# Patient Record
Sex: MALE | Race: BLACK OR AFRICAN AMERICAN | NOT HISPANIC OR LATINO | URBAN - METROPOLITAN AREA
[De-identification: names, ages, dates, MRNs, and addresses within clinical notes are randomized per-mention and may not be internally consistent; named-entity substitution may affect disease eponyms.]

---

## 2023-05-08 ENCOUNTER — APPOINTMENT (OUTPATIENT)
Dept: LAB | Facility: LAB | Age: 58
End: 2023-05-08
Payer: COMMERCIAL

## 2023-05-08 LAB
ALBUMIN (MG/L) IN URINE: 31.4 MG/L
ALBUMIN/CREATININE (UG/MG) IN URINE: 17.8 UG/MG CRT (ref 0–30)
CREATININE (MG/DL) IN URINE: 176 MG/DL (ref 20–370)
ESTIMATED AVERAGE GLUCOSE FOR HBA1C: 140 MG/DL
HEMOGLOBIN A1C/HEMOGLOBIN TOTAL IN BLOOD: 6.5 %

## 2023-06-26 ENCOUNTER — OFFICE VISIT (OUTPATIENT)
Dept: PRIMARY CARE | Facility: CLINIC | Age: 58
End: 2023-06-26
Payer: COMMERCIAL

## 2023-06-26 VITALS
SYSTOLIC BLOOD PRESSURE: 118 MMHG | HEART RATE: 62 BPM | HEIGHT: 64 IN | OXYGEN SATURATION: 100 % | WEIGHT: 180.1 LBS | DIASTOLIC BLOOD PRESSURE: 80 MMHG | BODY MASS INDEX: 30.75 KG/M2 | TEMPERATURE: 97.6 F

## 2023-06-26 DIAGNOSIS — E11.69 TYPE 2 DIABETES MELLITUS WITH OTHER SPECIFIED COMPLICATION, UNSPECIFIED WHETHER LONG TERM INSULIN USE (MULTI): Primary | ICD-10-CM

## 2023-06-26 PROCEDURE — 3044F HG A1C LEVEL LT 7.0%: CPT

## 2023-06-26 PROCEDURE — 99203 OFFICE O/P NEW LOW 30 MIN: CPT

## 2023-06-26 PROCEDURE — 3079F DIAST BP 80-89 MM HG: CPT

## 2023-06-26 PROCEDURE — 1036F TOBACCO NON-USER: CPT

## 2023-06-26 PROCEDURE — 3074F SYST BP LT 130 MM HG: CPT

## 2023-06-26 PROCEDURE — 3008F BODY MASS INDEX DOCD: CPT

## 2023-06-26 RX ORDER — DULAGLUTIDE 0.75 MG/.5ML
INJECTION, SOLUTION SUBCUTANEOUS
COMMUNITY
Start: 2023-05-05 | End: 2023-06-26

## 2023-06-26 RX ORDER — ISOPROPYL ALCOHOL 70 ML/100ML
SWAB TOPICAL
COMMUNITY
Start: 2023-02-10

## 2023-06-26 RX ORDER — METFORMIN HYDROCHLORIDE 500 MG/1
500 TABLET ORAL
COMMUNITY
End: 2024-01-05 | Stop reason: SDUPTHER

## 2023-06-26 RX ORDER — DULAGLUTIDE 1.5 MG/.5ML
1.5 INJECTION, SOLUTION SUBCUTANEOUS
Qty: 2 ML | Refills: 11 | Status: SHIPPED | OUTPATIENT
Start: 2023-06-26 | End: 2024-01-05 | Stop reason: WASHOUT

## 2023-06-26 RX ORDER — CALCIUM CITRATE/VITAMIN D3 200MG-6.25
TABLET ORAL
COMMUNITY
Start: 2023-02-10

## 2023-06-26 RX ORDER — INSULIN GLARGINE 100 [IU]/ML
INJECTION, SOLUTION SUBCUTANEOUS
COMMUNITY
End: 2024-01-05 | Stop reason: WASHOUT

## 2023-06-26 RX ORDER — ERTUGLIFLOZIN 15 MG/1
TABLET, FILM COATED ORAL
COMMUNITY
Start: 2023-05-02 | End: 2024-01-05 | Stop reason: WASHOUT

## 2023-06-26 RX ORDER — INSULIN LISPRO 100 [IU]/ML
INJECTION, SOLUTION INTRAVENOUS; SUBCUTANEOUS
COMMUNITY
Start: 2023-02-10 | End: 2024-01-05 | Stop reason: WASHOUT

## 2023-06-26 ASSESSMENT — ENCOUNTER SYMPTOMS
SHORTNESS OF BREATH: 0
FREQUENCY: 0

## 2023-06-26 ASSESSMENT — PAIN SCALES - GENERAL: PAINLEVEL: 0-NO PAIN

## 2023-06-26 NOTE — PROGRESS NOTES
"Subjective   Patient ID:   Nohemy White is a 58 y.o. male who presents for Establish Care (Type 2 Diabetes).  HPI  Mr. White was first diagnosed with T2DM in April 2021 with plan for follow-up of HbA1c in 2 months, but patient was lost to follow-up. Presented to hospital in Feb 2023 with fungal balangitis and a blood glucose in 400s. Discharged after 3 day hospital stay. Afterwards, began taking anti-diabetic medications and monitoring his blood gluocse at home with a Freestyle You monitor. Has been regularly visiting endocrinologist for management of his T2DM and has another follow-up on 6/30. Presents to clinic today to establish care. Reports that he watches what he eats and monitors blood sugar. He reports BG ranges between . Reports good adherence to anti-diabetic drugs. On ROS, patient denies loss of sensation in extremities, any changes in bladder habits, foot ulcers, or changes in vision. HE currently takes Metformin BID, Steglatro every day, Trulicity weekly, and 20U Lantus at bedtime.        Review of Systems   Respiratory:  Negative for shortness of breath.    Cardiovascular:  Negative for chest pain.   Genitourinary:  Negative for frequency.       Objective   /80 (BP Location: Left arm, Patient Position: Sitting, BP Cuff Size: Adult)   Pulse 62   Temp 36.4 °C (97.6 °F) (Temporal)   Ht 1.626 m (5' 4\")   Wt 81.7 kg (180 lb 1.6 oz)   SpO2 100%   BMI 30.91 kg/m²    Physical Exam  Constitutional:       General: He is not in acute distress.     Appearance: Normal appearance.   Eyes:      Extraocular Movements: Extraocular movements intact.   Cardiovascular:      Rate and Rhythm: Normal rate and regular rhythm.      Heart sounds: Normal heart sounds. No murmur heard.  Pulmonary:      Effort: Pulmonary effort is normal. No respiratory distress.      Breath sounds: Normal breath sounds.   Abdominal:      General: There is no distension.      Palpations: Abdomen is soft.      Tenderness: " There is no abdominal tenderness.   Skin:     Comments: No wounds seen on feet B/L, dry flaky skin with thickened nails   Neurological:      Mental Status: He is alert.         Assessment/Plan     Problem List Items Addressed This Visit    None  Nohemy White is a 58 year old with PHHx of DM who presents to the clinic to establish care.     #DM  - Last Hgb A1C 6.5 on 5/8/2023  - c/w metformin 500 mg BID & Steglatro every day  - Informed patient to stop Lantus  - Increased Trulicity from 0.75 mg to 1.5 mg Qweekly  - Referred to Ophthalmology & podiatry     RTC in 3 months for HM visit.    The patient was discussed with Dr. Ruiz.    Francesco Higgins MS3    I agree with the above assessment and plan.    Ashutosh Briggs MD  Family Medicine   PGY-2

## 2023-06-27 NOTE — PROGRESS NOTES
58 year old with PHHx of DM who presents to the clinic to establish care. Last Hgb A1C 6.5 on 5/8/2023. C/w metformin 500 mg BID & Steglatro every day. Patient informed to stop Lantus as Endocrine has previously mentioned. Increased Trulicity from 0.75 mg to 1.5 mg Qweekly. Referred to Ophthalmology & podiatry     Patient discussed with Dr. Ruiz.    Hugo Callaway MD   Resident Physician, PGY3  Family and Preventive Medicine

## 2023-07-03 NOTE — PROGRESS NOTES
I reviewed with the resident the medical history and the resident’s findings on physical examination.  I discussed with the resident the patient’s diagnosis and concur with the treatment plan as documented in the resident note.     Chaz Ruiz MD

## 2023-08-14 ENCOUNTER — APPOINTMENT (OUTPATIENT)
Dept: LAB | Facility: LAB | Age: 58
End: 2023-08-14
Payer: COMMERCIAL

## 2023-08-14 LAB
CHOLESTEROL (MG/DL) IN SER/PLAS: 149 MG/DL (ref 0–199)
CHOLESTEROL IN HDL (MG/DL) IN SER/PLAS: 67.3 MG/DL
CHOLESTEROL/HDL RATIO: 2.2
ESTIMATED AVERAGE GLUCOSE FOR HBA1C: 123 MG/DL
HEMOGLOBIN A1C/HEMOGLOBIN TOTAL IN BLOOD: 5.9 %
LDL: 71 MG/DL (ref 0–99)
TRIGLYCERIDE (MG/DL) IN SER/PLAS: 52 MG/DL (ref 0–149)
VLDL: 10 MG/DL (ref 0–40)

## 2023-11-13 ENCOUNTER — LAB (OUTPATIENT)
Dept: LAB | Facility: LAB | Age: 58
End: 2023-11-13
Payer: COMMERCIAL

## 2023-11-13 DIAGNOSIS — E11.9 TYPE 2 DIABETES MELLITUS WITHOUT COMPLICATIONS (MULTI): Primary | ICD-10-CM

## 2023-11-13 LAB
EST. AVERAGE GLUCOSE BLD GHB EST-MCNC: 123 MG/DL
HBA1C MFR BLD: 5.9 %

## 2023-11-13 PROCEDURE — 83036 HEMOGLOBIN GLYCOSYLATED A1C: CPT

## 2023-11-13 PROCEDURE — 36415 COLL VENOUS BLD VENIPUNCTURE: CPT

## 2024-01-03 NOTE — PROGRESS NOTES
Endocrinology  1/5/2024    History of Present Illness   Nohemy White is a 58 y.o. year old male with medical history of T2D, here for diabetes.     LV: 6/2023, Amee WAYNE  No health changes since last visit.   He continue to follow low carb and cut out sugary drinks.    Current regimen:   Metformin 500 mg BID     SMBG: Libre3     TIR at goal and essentially 100%, with lows registering when sensor malfunctioned   Very low variability  No hyperglycemia    Hypoglycemia: None    Hypoglycemia awareness: None   Weight changes: Denies   Denies any increased thirst, urination, or blurry vision.     Diet:   - 2 meals a day     DM history:   Year/age at diagnosis: February 2023 - diagnosed, A1c 10.9%  Prior medications:   - Steglatro - stopped due to cost   - Trulicity - was doing well so it was stopped   - Lantus - no longer needed    Previous hospitalizations for hyperglycemia: Denies   Complications: Denies   Last eye exam: 2015   Last foot exam: Never     Review of Systems   General: Denies fever or chills   Head: Denies headache or vision changes   Neck: Denies tenderness or lumps   Cardiac: Denies chest pain or palpitations   Respiratory: Denies shortness of breath or cough   GI: Denies abdominal pain, nausea, or vomiting   Extremities: Denies swelling   Skin: Denies rash     Medications     Current Outpatient Medications   Medication Instructions    Easy Touch Alcohol Prep Pads pads, medicated     Lantus Solostar U-100 Insulin 100 unit/mL (3 mL) pen INJECT 30 UNITS SUBCUTANEOUSLY IN THE EVENING AS DIRECTED    metFORMIN (GLUCOPHAGE) 500 mg, oral, 2 times daily with meals    True Metrix Glucose Test Strip strip         History     Past Medical History:   Diagnosis Date    Personal history of other diseases of male genital organs 04/28/2021    History of benign prostatic hyperplasia       No past surgical history on file.    No family history on file.     No Known Allergies     Social history:   - No tobacco  "use   - No alcohol use   - Works in [employer] department at [employer] - on his feet all day    - Lives with mother and sister     Physical Exam   /66   Pulse 79   Ht 1.626 m (5' 4\")   Wt 80.7 kg (178 lb)   BMI 30.55 kg/m²   General: Well appearing, no acute distress  Heart: Normal rate  Neck: No visible goiter  Lungs: Breathing comfortably on room air   Skin: No rashes    Labs and Imaging     Lab Results   Component Value Date    HGBA1C 5.9 (H) 11/13/2023    HGBA1C 5.9 (A) 08/14/2023    HGBA1C 6.5 (A) 05/08/2023     (L) 02/10/2023    K 3.6 02/10/2023    CL 99 02/10/2023    CO2 26 02/10/2023    BUN 13 02/10/2023    CREATININE 0.96 02/10/2023    CALCIUM 8.5 (L) 02/10/2023    ALBUMIN 4.1 02/07/2023    PROT 7.5 02/07/2023    BILITOT 0.6 02/07/2023    ALKPHOS 122 (H) 02/07/2023    ALT 30 02/07/2023    AST 41 (H) 02/07/2023    GLUCOSE 244 (H) 02/10/2023    CHOL 149 08/14/2023    TRIG 52 08/14/2023    HDL 67.3 08/14/2023    BHYDRXBUT 0.22 04/09/2021    HTM91RP 0.00 04/09/2021     Component      Latest Ref Rng 5/8/2023   ALBUMIN (MG/L) IN URINE      Not Established mg/L 31.4    Albumin/Creatine Ratio      0.0 - 30.0 ug/mg crt 17.8    Creatinine, Urine Random      20.0 - 370.0 mg/dL 176.0        Component      Latest Ref Rng 8/14/2023   CHOLESTEROL      0 - 199 mg/dL 149    HDL CHOLESTEROL      mg/dL 67.3    Cholesterol/HDL Ratio 2.2    LDL Calculated      0 - 99 mg/dL 71    VLDL      0 - 40 mg/dL 10    TRIGLYCERIDES      0 - 149 mg/dL 52        Assessment and Plan   Nohemy White is a 58 y.o. year old male with medical history of T2D, here for diabetes. He is doing excellent, with essentially 100% TIR. He is only on metformin now (at time of diagnosis, A1c > 10% and he was on lantus, GLP1RA, and SGLT2i). No other co-morbidities such as CKD or ASCVD. Given his diagnosis of T2D, he should be on a moderate intensity statin as well and needs yearly eye exams.     # T2D:   - Continue metformin 500 mg BID   - " Continue Libre3 - he finds this helpful   - Lipids: LDL right at goal (< 70). Due 8/2024, start atorvastatin 10 mg daily    - BP: Controlled, not on any antihypertensives    - Urine Alb/Cr: Normal, due 5/2024  - Eye exam: Referral placed, due now     RTC: 6 months     Encouraged him to also follow with PCP      Christine Church, DO   Endocrinology

## 2024-01-05 ENCOUNTER — OFFICE VISIT (OUTPATIENT)
Dept: ENDOCRINOLOGY | Facility: CLINIC | Age: 59
End: 2024-01-05
Payer: COMMERCIAL

## 2024-01-05 VITALS
BODY MASS INDEX: 30.39 KG/M2 | SYSTOLIC BLOOD PRESSURE: 116 MMHG | WEIGHT: 178 LBS | HEART RATE: 79 BPM | HEIGHT: 64 IN | DIASTOLIC BLOOD PRESSURE: 66 MMHG

## 2024-01-05 DIAGNOSIS — E11.65 TYPE 2 DIABETES MELLITUS WITH HYPERGLYCEMIA, WITHOUT LONG-TERM CURRENT USE OF INSULIN (MULTI): Primary | ICD-10-CM

## 2024-01-05 PROCEDURE — 3078F DIAST BP <80 MM HG: CPT | Performed by: STUDENT IN AN ORGANIZED HEALTH CARE EDUCATION/TRAINING PROGRAM

## 2024-01-05 PROCEDURE — 1036F TOBACCO NON-USER: CPT | Performed by: STUDENT IN AN ORGANIZED HEALTH CARE EDUCATION/TRAINING PROGRAM

## 2024-01-05 PROCEDURE — 99215 OFFICE O/P EST HI 40 MIN: CPT | Performed by: STUDENT IN AN ORGANIZED HEALTH CARE EDUCATION/TRAINING PROGRAM

## 2024-01-05 PROCEDURE — 3074F SYST BP LT 130 MM HG: CPT | Performed by: STUDENT IN AN ORGANIZED HEALTH CARE EDUCATION/TRAINING PROGRAM

## 2024-01-05 PROCEDURE — 95251 CONT GLUC MNTR ANALYSIS I&R: CPT | Performed by: STUDENT IN AN ORGANIZED HEALTH CARE EDUCATION/TRAINING PROGRAM

## 2024-01-05 PROCEDURE — 3008F BODY MASS INDEX DOCD: CPT | Performed by: STUDENT IN AN ORGANIZED HEALTH CARE EDUCATION/TRAINING PROGRAM

## 2024-01-05 RX ORDER — ATORVASTATIN CALCIUM 10 MG/1
10 TABLET, FILM COATED ORAL DAILY
Qty: 30 TABLET | Refills: 11 | Status: SHIPPED | OUTPATIENT
Start: 2024-01-05 | End: 2024-01-05 | Stop reason: SDUPTHER

## 2024-01-05 RX ORDER — METFORMIN HYDROCHLORIDE 500 MG/1
500 TABLET ORAL
Qty: 60 TABLET | Refills: 11 | Status: SHIPPED | OUTPATIENT
Start: 2024-01-05

## 2024-01-05 RX ORDER — ATORVASTATIN CALCIUM 10 MG/1
10 TABLET, FILM COATED ORAL DAILY
Qty: 30 TABLET | Refills: 11 | Status: SHIPPED | OUTPATIENT
Start: 2024-01-05 | End: 2025-01-04

## 2024-01-05 RX ORDER — BLOOD-GLUCOSE SENSOR
EACH MISCELLANEOUS
Qty: 2 EACH | Refills: 11 | Status: SHIPPED | OUTPATIENT
Start: 2024-01-05 | End: 2024-01-05 | Stop reason: SDUPTHER

## 2024-01-05 RX ORDER — BLOOD-GLUCOSE SENSOR
EACH MISCELLANEOUS
Qty: 2 EACH | Refills: 11 | Status: SHIPPED | OUTPATIENT
Start: 2024-01-05

## 2024-01-05 RX ORDER — METFORMIN HYDROCHLORIDE 500 MG/1
500 TABLET ORAL
Qty: 60 TABLET | Refills: 11 | Status: SHIPPED | OUTPATIENT
Start: 2024-01-05 | End: 2024-01-05 | Stop reason: SDUPTHER

## 2024-01-05 ASSESSMENT — PAIN SCALES - GENERAL: PAINLEVEL: 0-NO PAIN

## 2024-01-05 NOTE — PATIENT INSTRUCTIONS
- Keep up the great work!!!!   - Continue taking the metformin   - Start atorvastatin 10 mg daily   - See eye doctor once a year   - I encourage you to see primary care physician at least once a year to make sure you are up to date on immunizations and cancer screenings   - Follow up in 6 months

## 2024-02-19 ENCOUNTER — LAB (OUTPATIENT)
Dept: LAB | Facility: LAB | Age: 59
End: 2024-02-19
Payer: COMMERCIAL

## 2024-02-19 DIAGNOSIS — E11.9 TYPE 2 DIABETES MELLITUS WITHOUT COMPLICATIONS (MULTI): Primary | ICD-10-CM

## 2024-02-19 LAB
EST. AVERAGE GLUCOSE BLD GHB EST-MCNC: 128 MG/DL
HBA1C MFR BLD: 6.1 %

## 2024-02-19 PROCEDURE — 83036 HEMOGLOBIN GLYCOSYLATED A1C: CPT

## 2024-02-19 PROCEDURE — 36415 COLL VENOUS BLD VENIPUNCTURE: CPT

## 2024-05-20 ENCOUNTER — LAB (OUTPATIENT)
Dept: LAB | Facility: LAB | Age: 59
End: 2024-05-20
Payer: COMMERCIAL

## 2024-05-20 DIAGNOSIS — E11.9 TYPE 2 DIABETES MELLITUS WITHOUT COMPLICATION, WITHOUT LONG-TERM CURRENT USE OF INSULIN (MULTI): ICD-10-CM

## 2024-05-20 LAB
EST. AVERAGE GLUCOSE BLD GHB EST-MCNC: 126 MG/DL
HBA1C MFR BLD: 6 %

## 2024-05-20 PROCEDURE — 36415 COLL VENOUS BLD VENIPUNCTURE: CPT

## 2024-05-20 PROCEDURE — 83036 HEMOGLOBIN GLYCOSYLATED A1C: CPT

## 2024-07-05 ENCOUNTER — APPOINTMENT (OUTPATIENT)
Dept: ENDOCRINOLOGY | Facility: CLINIC | Age: 59
End: 2024-07-05
Payer: COMMERCIAL

## 2024-07-05 VITALS
SYSTOLIC BLOOD PRESSURE: 102 MMHG | HEIGHT: 65 IN | HEART RATE: 77 BPM | DIASTOLIC BLOOD PRESSURE: 61 MMHG | BODY MASS INDEX: 29.82 KG/M2 | WEIGHT: 179 LBS

## 2024-07-05 DIAGNOSIS — E11.65 TYPE 2 DIABETES MELLITUS WITH HYPERGLYCEMIA, WITHOUT LONG-TERM CURRENT USE OF INSULIN (MULTI): Primary | ICD-10-CM

## 2024-07-05 PROCEDURE — 95251 CONT GLUC MNTR ANALYSIS I&R: CPT | Performed by: STUDENT IN AN ORGANIZED HEALTH CARE EDUCATION/TRAINING PROGRAM

## 2024-07-05 PROCEDURE — 1036F TOBACCO NON-USER: CPT | Performed by: STUDENT IN AN ORGANIZED HEALTH CARE EDUCATION/TRAINING PROGRAM

## 2024-07-05 PROCEDURE — 3074F SYST BP LT 130 MM HG: CPT | Performed by: STUDENT IN AN ORGANIZED HEALTH CARE EDUCATION/TRAINING PROGRAM

## 2024-07-05 PROCEDURE — 3044F HG A1C LEVEL LT 7.0%: CPT | Performed by: STUDENT IN AN ORGANIZED HEALTH CARE EDUCATION/TRAINING PROGRAM

## 2024-07-05 PROCEDURE — 3078F DIAST BP <80 MM HG: CPT | Performed by: STUDENT IN AN ORGANIZED HEALTH CARE EDUCATION/TRAINING PROGRAM

## 2024-07-05 PROCEDURE — 99214 OFFICE O/P EST MOD 30 MIN: CPT | Performed by: STUDENT IN AN ORGANIZED HEALTH CARE EDUCATION/TRAINING PROGRAM

## 2024-07-05 NOTE — PATIENT INSTRUCTIONS
Ok to try stopping metformin   I will look at your You report in 3 weeks   If I think you need to restart medications at that time I will call you     Get labs checked  Please establish with PCP   Please see eye doctor

## 2024-07-05 NOTE — PROGRESS NOTES
Endocrinology  7/5/2024    History of Present Illness   Nohemy White is a 59 y.o. year old male with medical history of T2D, here for diabetes.     LV: 1/5/2024  No health changes since last visit.   He would like to try to come off metformin.     Current regimen:   Metformin 500 mg BID     SMBG: Libre3     TIR at goal and   Very low variability    Hypoglycemia: None    Hypoglycemia awareness: None   Weight changes: Denies   Denies any increased thirst, urination, or blurry vision.     Diet:   - 2 meals a day     DM history:   Year/age at diagnosis: February 2023 - diagnosed, A1c 10.9%  Prior medications:   - Farxiga/Steglatro - stopped due to cost   - Trulicity - was doing well so it was stopped   - Lantus - no longer needed    Previous hospitalizations for hyperglycemia: Denies   Complications: Denies     Last eye exam: 2015   Last foot exam: Never     Review of Systems   General: Denies fever or chills   Head: Denies headache or vision changes   Neck: Denies tenderness or lumps   Cardiac: Denies chest pain or palpitations   Respiratory: Denies shortness of breath or cough   GI: Denies abdominal pain, nausea, or vomiting   Extremities: Denies swelling   Skin: Denies rash     Medications     Current Outpatient Medications   Medication Instructions    atorvastatin (LIPITOR) 10 mg, oral, Daily    blood-glucose sensor (FreeStyle You 3 Sensor) device Change sensor every 14 days    Easy Touch Alcohol Prep Pads pads, medicated     metFORMIN (GLUCOPHAGE) 500 mg, oral, 2 times daily (morning and late afternoon)    True Metrix Glucose Test Strip strip         History     Past Medical History:   Diagnosis Date    Personal history of other diseases of male genital organs 04/28/2021    History of benign prostatic hyperplasia       No past surgical history on file.    No family history on file.     No Known Allergies     Social history:   - No tobacco use   - No alcohol use   - Works in [employer] department at [employer] - on  "his feet all day    - Lives with mother and sister     Physical Exam   /61   Pulse 77   Ht 1.651 m (5' 5\")   Wt 81.2 kg (179 lb)   BMI 29.79 kg/m²   General: Well appearing, no acute distress  Heart: Normal rate  Neck: No visible goiter  Lungs: Breathing comfortably on room air   Skin: No rashes    Labs and Imaging     Lab Results   Component Value Date    HGBA1C 6.0 (H) 05/20/2024    HGBA1C 6.1 (H) 02/19/2024    HGBA1C 5.9 (H) 11/13/2023     (L) 02/10/2023    K 3.6 02/10/2023    CL 99 02/10/2023    CO2 26 02/10/2023    BUN 13 02/10/2023    CREATININE 0.96 02/10/2023    CALCIUM 8.5 (L) 02/10/2023    ALBUMIN 4.1 02/07/2023    PROT 7.5 02/07/2023    BILITOT 0.6 02/07/2023    ALKPHOS 122 (H) 02/07/2023    ALT 30 02/07/2023    AST 41 (H) 02/07/2023    GLUCOSE 244 (H) 02/10/2023    CHOL 149 08/14/2023    TRIG 52 08/14/2023    HDL 67.3 08/14/2023    BHYDRXBUT 0.22 04/09/2021    FLF95UG 0.00 04/09/2021     Component      Latest Ref Rng 5/8/2023   ALBUMIN (MG/L) IN URINE      Not Established mg/L 31.4    Albumin/Creatine Ratio      0.0 - 30.0 ug/mg crt 17.8    Creatinine, Urine Random      20.0 - 370.0 mg/dL 176.0        Component      Latest Ref Rng 8/14/2023   CHOLESTEROL      0 - 199 mg/dL 149    HDL CHOLESTEROL      mg/dL 67.3    Cholesterol/HDL Ratio 2.2    LDL Calculated      0 - 99 mg/dL 71    VLDL      0 - 40 mg/dL 10    TRIGLYCERIDES      0 - 149 mg/dL 52        Assessment and Plan   Nohemy White is a 58 y.o. year old male with medical history of T2D, here for diabetes. He is doing excellent, with essentially 100% TIR. He is only on metformin now (at time of diagnosis, A1c > 10% and he was on lantus, GLP1RA, and SGLT2i). No other co-morbidities such as CKD or ASCVD. Given his diagnosis of T2D, we started moderate intensity statin and he is due for follow up labs.     Can trial stopping metformin. I will look at his telma report in ~3 weeks and let him know if he needs to restart it.   Otherwise, " I think it would be best if he established with a primary care provider at this time, and can follow up with endocrinology as needed in the future.     # T2D:   - Can stop metformin 500 mg BID   - Continue Libre3   - Lipids: Due, goal LDL < 70, on atorvastatin 10 mg once daily   - BP: Controlled, not on any antihypertensives    - Urine Alb/Cr: Normal, due   - Eye exam: Due    Will make PCP appointment today   RTC: As needed     Christine Church, DO   Endocrinology

## 2024-07-15 ENCOUNTER — LAB (OUTPATIENT)
Dept: LAB | Facility: LAB | Age: 59
End: 2024-07-15
Payer: COMMERCIAL

## 2024-07-15 DIAGNOSIS — E11.65 TYPE 2 DIABETES MELLITUS WITH HYPERGLYCEMIA, WITHOUT LONG-TERM CURRENT USE OF INSULIN (MULTI): ICD-10-CM

## 2024-07-15 LAB
ALBUMIN SERPL BCP-MCNC: 4.3 G/DL (ref 3.4–5)
ALP SERPL-CCNC: 59 U/L (ref 33–120)
ALT SERPL W P-5'-P-CCNC: 18 U/L (ref 10–52)
ANION GAP SERPL CALC-SCNC: 10 MMOL/L (ref 10–20)
AST SERPL W P-5'-P-CCNC: 14 U/L (ref 9–39)
BILIRUB SERPL-MCNC: 0.4 MG/DL (ref 0–1.2)
BUN SERPL-MCNC: 14 MG/DL (ref 6–23)
CALCIUM SERPL-MCNC: 9.4 MG/DL (ref 8.6–10.6)
CHLORIDE SERPL-SCNC: 104 MMOL/L (ref 98–107)
CHOLEST SERPL-MCNC: 123 MG/DL (ref 0–199)
CHOLESTEROL/HDL RATIO: 1.9
CO2 SERPL-SCNC: 31 MMOL/L (ref 21–32)
CREAT SERPL-MCNC: 1.03 MG/DL (ref 0.5–1.3)
CREAT UR-MCNC: 154.8 MG/DL (ref 20–370)
EGFRCR SERPLBLD CKD-EPI 2021: 85 ML/MIN/1.73M*2
GLUCOSE SERPL-MCNC: 110 MG/DL (ref 74–99)
HDLC SERPL-MCNC: 64.2 MG/DL
LDLC SERPL CALC-MCNC: 49 MG/DL
MICROALBUMIN UR-MCNC: 9.6 MG/L
MICROALBUMIN/CREAT UR: 6.2 UG/MG CREAT
NON HDL CHOLESTEROL: 59 MG/DL (ref 0–149)
POTASSIUM SERPL-SCNC: 4.4 MMOL/L (ref 3.5–5.3)
PROT SERPL-MCNC: 7.2 G/DL (ref 6.4–8.2)
SODIUM SERPL-SCNC: 141 MMOL/L (ref 136–145)
TRIGL SERPL-MCNC: 48 MG/DL (ref 0–149)
VLDL: 10 MG/DL (ref 0–40)

## 2024-07-15 PROCEDURE — 36415 COLL VENOUS BLD VENIPUNCTURE: CPT

## 2024-07-15 PROCEDURE — 82043 UR ALBUMIN QUANTITATIVE: CPT

## 2024-07-15 PROCEDURE — 80061 LIPID PANEL: CPT

## 2024-07-15 PROCEDURE — 82570 ASSAY OF URINE CREATININE: CPT

## 2024-07-15 PROCEDURE — 80053 COMPREHEN METABOLIC PANEL: CPT

## 2024-08-06 DIAGNOSIS — E11.65 TYPE 2 DIABETES MELLITUS WITH HYPERGLYCEMIA, WITHOUT LONG-TERM CURRENT USE OF INSULIN (MULTI): ICD-10-CM

## 2024-08-06 RX ORDER — BLOOD-GLUCOSE SENSOR
EACH MISCELLANEOUS
Qty: 2 EACH | Refills: 11 | Status: SHIPPED | OUTPATIENT
Start: 2024-08-06

## 2024-08-07 ENCOUNTER — TELEPHONE (OUTPATIENT)
Dept: ENDOCRINOLOGY | Facility: CLINIC | Age: 59
End: 2024-08-07
Payer: COMMERCIAL

## 2024-08-07 NOTE — TELEPHONE ENCOUNTER
Received CRM on 8/5 at 2:38pm stating:    Information has been provided to Patient.PT WOULD LIKE A CALL BACK [phone] REGARDING A RX REFILL

## 2024-08-07 NOTE — TELEPHONE ENCOUNTER
Spoke with patient, patient needed refill on free style telma. Refill script was sent yesterday. Patient picked up script also yesterday.

## 2024-08-08 ENCOUNTER — TELEPHONE (OUTPATIENT)
Dept: ENDOCRINOLOGY | Facility: CLINIC | Age: 59
End: 2024-08-08
Payer: COMMERCIAL

## 2024-09-24 ENCOUNTER — APPOINTMENT (OUTPATIENT)
Dept: PRIMARY CARE | Facility: CLINIC | Age: 59
End: 2024-09-24
Payer: COMMERCIAL

## 2024-11-08 DIAGNOSIS — E11.65 TYPE 2 DIABETES MELLITUS WITH HYPERGLYCEMIA, WITHOUT LONG-TERM CURRENT USE OF INSULIN: ICD-10-CM

## 2024-11-11 RX ORDER — BLOOD-GLUCOSE SENSOR
EACH MISCELLANEOUS
Qty: 2 EACH | Refills: 11 | Status: SHIPPED | OUTPATIENT
Start: 2024-11-11

## 2024-12-02 ENCOUNTER — APPOINTMENT (OUTPATIENT)
Dept: OPHTHALMOLOGY | Facility: CLINIC | Age: 59
End: 2024-12-02
Payer: COMMERCIAL

## 2024-12-04 DIAGNOSIS — E11.65 TYPE 2 DIABETES MELLITUS WITH HYPERGLYCEMIA, WITHOUT LONG-TERM CURRENT USE OF INSULIN: ICD-10-CM

## 2024-12-10 RX ORDER — BLOOD-GLUCOSE SENSOR
EACH MISCELLANEOUS
Qty: 2 EACH | Refills: 11 | Status: SHIPPED | OUTPATIENT
Start: 2024-12-10 | End: 2024-12-12 | Stop reason: SDUPTHER

## 2024-12-12 ENCOUNTER — APPOINTMENT (OUTPATIENT)
Dept: PRIMARY CARE | Facility: CLINIC | Age: 59
End: 2024-12-12
Payer: COMMERCIAL

## 2024-12-12 VITALS
WEIGHT: 198 LBS | HEIGHT: 65 IN | DIASTOLIC BLOOD PRESSURE: 65 MMHG | BODY MASS INDEX: 32.99 KG/M2 | HEART RATE: 80 BPM | SYSTOLIC BLOOD PRESSURE: 107 MMHG

## 2024-12-12 DIAGNOSIS — Z00.00 ROUTINE GENERAL MEDICAL EXAMINATION AT A HEALTH CARE FACILITY: Primary | ICD-10-CM

## 2024-12-12 DIAGNOSIS — E11.65 TYPE 2 DIABETES MELLITUS WITH HYPERGLYCEMIA, WITHOUT LONG-TERM CURRENT USE OF INSULIN: ICD-10-CM

## 2024-12-12 DIAGNOSIS — E55.9 VITAMIN D DEFICIENCY: ICD-10-CM

## 2024-12-12 PROCEDURE — 3008F BODY MASS INDEX DOCD: CPT | Performed by: INTERNAL MEDICINE

## 2024-12-12 PROCEDURE — 99213 OFFICE O/P EST LOW 20 MIN: CPT | Performed by: INTERNAL MEDICINE

## 2024-12-12 PROCEDURE — 3061F NEG MICROALBUMINURIA REV: CPT | Performed by: INTERNAL MEDICINE

## 2024-12-12 PROCEDURE — G2211 COMPLEX E/M VISIT ADD ON: HCPCS | Performed by: INTERNAL MEDICINE

## 2024-12-12 PROCEDURE — 3044F HG A1C LEVEL LT 7.0%: CPT | Performed by: INTERNAL MEDICINE

## 2024-12-12 PROCEDURE — 3074F SYST BP LT 130 MM HG: CPT | Performed by: INTERNAL MEDICINE

## 2024-12-12 PROCEDURE — 3048F LDL-C <100 MG/DL: CPT | Performed by: INTERNAL MEDICINE

## 2024-12-12 PROCEDURE — 3078F DIAST BP <80 MM HG: CPT | Performed by: INTERNAL MEDICINE

## 2024-12-12 PROCEDURE — 1036F TOBACCO NON-USER: CPT | Performed by: INTERNAL MEDICINE

## 2024-12-12 RX ORDER — BLOOD-GLUCOSE SENSOR
EACH MISCELLANEOUS
Qty: 2 EACH | Refills: 11 | Status: SHIPPED | OUTPATIENT
Start: 2024-12-12 | End: 2024-12-12 | Stop reason: ALTCHOICE

## 2024-12-12 RX ORDER — CALCIUM CITRATE/VITAMIN D3 200MG-6.25
1 TABLET ORAL DAILY
Qty: 100 EACH | Refills: 3 | Status: SHIPPED | OUTPATIENT
Start: 2024-12-12 | End: 2025-12-12

## 2024-12-12 RX ORDER — LANCETS
EACH MISCELLANEOUS
Qty: 100 EACH | Refills: 3 | Status: SHIPPED | OUTPATIENT
Start: 2024-12-12

## 2024-12-12 RX ORDER — ATORVASTATIN CALCIUM 10 MG/1
10 TABLET, FILM COATED ORAL DAILY
Qty: 30 TABLET | Refills: 11 | Status: SHIPPED | OUTPATIENT
Start: 2024-12-12 | End: 2025-12-12

## 2024-12-12 ASSESSMENT — PAIN SCALES - GENERAL: PAINLEVEL_OUTOF10: 0-NO PAIN

## 2024-12-12 NOTE — PATIENT INSTRUCTIONS
As discussed today, our plan is:     Labs - you can get this done today or come back anytime in the next 4-6 weeks at any  facility. Our office will contact you if any of your results are abnormal. You can view all of your results on [employer].    Optometry  Try [facility] or [facility]        Please come back to see me in: 6 months   ------  If you have any problems or questions, please contact the clinic at [phone] to leave a message. Our fax number is [phone]. If your issue cannot wait until the next business day, please go to urgent care or the emergency department.     No shows: It is understandable if you are unable to make it to a visit, but please cancel your appointment instead of not showing up. This helps to give other patients access to primary care and keeps wait times low.      Dr. Jeremy Montemayor

## 2024-12-12 NOTE — PROGRESS NOTES
INTERNAL MEDICINE - PRIMARY CARE  New patient visit     Nohemy White is 59 y.o. a male  who presents to [facility].      Problem list   Prediabetes  Never smoker  DLD      Subjective    HPI   The patient presents with the following concerns:    Diabetes Management:  The patient reports checking his blood sugars at home but has run out of Freestyle You sensors and needs a new prescription. He is no longer on insulin or any other diabetes medications. The patient prefers to monitor his blood sugar levels due to fluctuations and expresses a desire to continue using the Freestyle You system even if insurance may not cover it. Discussed that given he is no longer diabetic and not on medications for control there is no medical rationale to check BG daily. I'm willing to continue his normal glucometer measurements as he is insistent on continuing.     Cholesterol Management:  The patient has not been taking his prescribed cholesterol medication since one month after it was initially prescribed in January. He is unsure if he still needs the medication and inquires about his cholesterol levels.    Cardiovascular Health:  The patient denies any chest pain, shortness of breath, or wheezing. He reports occasional shortness of breath when climbing stairs but is otherwise active in [employer] activities without any issues.    Diet and Nutrition:  The patient's diet consists of home-cooked meals prepared by his sister, including rice, chicken, and peanut butter sandwiches. He consumes meat but avoids pork. 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    Gastrointestinal and Urinary Function:  The patient reports drinking enough water and denies any issues with constipation, diarrhea, or urination.    Sleep:  His sleep pattern consists of falling asleep quickly and sleeping for about 5 hours per night.    Vision:  The patient's last eye exam was in  "2015,.    Preventive Care:  The patient declines a flu shot during this visit.    Exam    Physical Exam     Visit Vitals  /65   Pulse 80   Ht 1.651 m (5' 5\")   Wt 89.8 kg (198 lb)   BMI 32.95 kg/m²   Smoking Status Never   BSA 2.03 m²        Physical Exam  Vitals reviewed.   Constitutional:       Appearance: Normal appearance. He is normal weight.   HENT:      Right Ear: Tympanic membrane normal.      Left Ear: Tympanic membrane normal.   Cardiovascular:      Rate and Rhythm: Normal rate.   Pulmonary:      Effort: Pulmonary effort is normal.      Breath sounds: Normal breath sounds.   Neurological:      Mental Status: He is alert.          Objective    Medications     Current Outpatient Medications   Medication Instructions    atorvastatin (LIPITOR) 10 mg, oral, Daily    blood sugar diagnostic (True Metrix Glucose Test Strip) strip 1 strip, miscellaneous, Daily    Easy Touch Alcohol Prep Pads pads, medicated     lancets misc For use to check    True Metrix Glucose Test Strip strip          Assessment/Plan    Assessment/Plan    Prediabetes:  - Patient monitoring blood sugars at home. Repeat A1c today     Dyslipidemia:  - Patient not taking cholesterol medication as prescribed.  - Plan: Recheck lipid panel and renew prescription for cholesterol medication.     Eye Health:  - Last eye exam was in 2015.  - Plan: Place referral for an eye doctor.    Follow-up:  - Plan: Schedule follow-up appointment in 6 months. If stable, transition to yearly check-ups. Send letter with lab results.    Preventive Care:  - Colonoscopy completed. No smoking history. No new swelling in legs.  - Plan: Flu shot recommended, but patient declined.   Problem List Items Addressed This Visit    None  Visit Diagnoses       Routine general medical examination at a health care facility    -  Primary    Relevant Orders    Comprehensive Metabolic Panel    Lipid Panel    CBC    Type 2 diabetes mellitus with hyperglycemia, without long-term " current use of insulin        Relevant Medications    atorvastatin (Lipitor) 10 mg tablet    blood sugar diagnostic (True Metrix Glucose Test Strip) strip    lancets misc    Other Relevant Orders    Hemoglobin A1C    Referral to Ophthalmology    Vitamin D deficiency        Relevant Orders    Vitamin D 25-Hydroxy,Total (for eval of Vitamin D levels)                     Jeremy Montemayor MD MPH  I am the attending physician.

## 2025-02-07 LAB
25(OH)D3+25(OH)D2 SERPL-MCNC: 11 NG/ML (ref 30–100)
ALBUMIN SERPL-MCNC: 4.6 G/DL (ref 3.6–5.1)
ALP SERPL-CCNC: 65 U/L (ref 35–144)
ALT SERPL-CCNC: 20 U/L (ref 9–46)
ANION GAP SERPL CALCULATED.4IONS-SCNC: 10 MMOL/L (CALC) (ref 7–17)
AST SERPL-CCNC: 20 U/L (ref 10–35)
BILIRUB SERPL-MCNC: 0.6 MG/DL (ref 0.2–1.2)
BUN SERPL-MCNC: 16 MG/DL (ref 7–25)
CALCIUM SERPL-MCNC: 9.5 MG/DL (ref 8.6–10.3)
CHLORIDE SERPL-SCNC: 102 MMOL/L (ref 98–110)
CHOLEST SERPL-MCNC: 138 MG/DL
CHOLEST/HDLC SERPL: 2 (CALC)
CO2 SERPL-SCNC: 28 MMOL/L (ref 20–32)
CREAT SERPL-MCNC: 1.03 MG/DL (ref 0.7–1.3)
EGFRCR SERPLBLD CKD-EPI 2021: 84 ML/MIN/1.73M2
ERYTHROCYTE [DISTWIDTH] IN BLOOD BY AUTOMATED COUNT: 13.8 % (ref 11–15)
EST. AVERAGE GLUCOSE BLD GHB EST-MCNC: 146 MG/DL
EST. AVERAGE GLUCOSE BLD GHB EST-SCNC: 8.1 MMOL/L
GLUCOSE SERPL-MCNC: 117 MG/DL (ref 65–99)
HBA1C MFR BLD: 6.7 % OF TOTAL HGB
HCT VFR BLD AUTO: 45.6 % (ref 38.5–50)
HDLC SERPL-MCNC: 69 MG/DL
HGB BLD-MCNC: 14.5 G/DL (ref 13.2–17.1)
LDLC SERPL CALC-MCNC: 54 MG/DL (CALC)
MCH RBC QN AUTO: 26.3 PG (ref 27–33)
MCHC RBC AUTO-ENTMCNC: 31.8 G/DL (ref 32–36)
MCV RBC AUTO: 82.8 FL (ref 80–100)
NONHDLC SERPL-MCNC: 69 MG/DL (CALC)
PLATELET # BLD AUTO: 286 THOUSAND/UL (ref 140–400)
PMV BLD REES-ECKER: 9.8 FL (ref 7.5–12.5)
POTASSIUM SERPL-SCNC: 4.7 MMOL/L (ref 3.5–5.3)
PROT SERPL-MCNC: 7.5 G/DL (ref 6.1–8.1)
RBC # BLD AUTO: 5.51 MILLION/UL (ref 4.2–5.8)
SODIUM SERPL-SCNC: 140 MMOL/L (ref 135–146)
TRIGL SERPL-MCNC: 66 MG/DL
WBC # BLD AUTO: 4.3 THOUSAND/UL (ref 3.8–10.8)

## 2025-02-19 DIAGNOSIS — E55.9 VITAMIN D DEFICIENCY: Primary | ICD-10-CM

## 2025-02-19 RX ORDER — ERGOCALCIFEROL 1.25 MG/1
50000 CAPSULE ORAL WEEKLY
Qty: 12 CAPSULE | Refills: 0 | Status: SHIPPED | OUTPATIENT
Start: 2025-02-19 | End: 2025-05-14

## 2025-04-07 ENCOUNTER — APPOINTMENT (OUTPATIENT)
Dept: OPHTHALMOLOGY | Facility: CLINIC | Age: 60
End: 2025-04-07
Payer: COMMERCIAL

## 2025-06-12 ENCOUNTER — APPOINTMENT (OUTPATIENT)
Dept: PRIMARY CARE | Facility: CLINIC | Age: 60
End: 2025-06-12
Payer: COMMERCIAL

## 2025-06-12 VITALS
WEIGHT: 208 LBS | BODY MASS INDEX: 35.51 KG/M2 | HEIGHT: 64 IN | HEART RATE: 80 BPM | SYSTOLIC BLOOD PRESSURE: 105 MMHG | DIASTOLIC BLOOD PRESSURE: 67 MMHG

## 2025-06-12 DIAGNOSIS — Z91.148 NONADHERENCE TO MEDICATION: ICD-10-CM

## 2025-06-12 DIAGNOSIS — E11.65 TYPE 2 DIABETES MELLITUS WITH HYPERGLYCEMIA, WITHOUT LONG-TERM CURRENT USE OF INSULIN: Primary | ICD-10-CM

## 2025-06-12 DIAGNOSIS — R63.5 ABNORMAL WEIGHT GAIN: ICD-10-CM

## 2025-06-12 PROBLEM — N40.0 BPH WITHOUT URINARY OBSTRUCTION: Status: ACTIVE | Noted: 2025-06-12

## 2025-06-12 PROCEDURE — 3074F SYST BP LT 130 MM HG: CPT | Performed by: INTERNAL MEDICINE

## 2025-06-12 PROCEDURE — 3008F BODY MASS INDEX DOCD: CPT | Performed by: INTERNAL MEDICINE

## 2025-06-12 PROCEDURE — 1036F TOBACCO NON-USER: CPT | Performed by: INTERNAL MEDICINE

## 2025-06-12 PROCEDURE — 3078F DIAST BP <80 MM HG: CPT | Performed by: INTERNAL MEDICINE

## 2025-06-12 PROCEDURE — 99213 OFFICE O/P EST LOW 20 MIN: CPT | Performed by: INTERNAL MEDICINE

## 2025-06-12 ASSESSMENT — PAIN SCALES - GENERAL: PAINLEVEL_OUTOF10: 0-NO PAIN

## 2025-06-12 NOTE — PROGRESS NOTES
"  INTERNAL MEDICINE - PRIMARY CARE  Established patient visit       Nohemy White is 60 y.o. a male   who presents for Follow-up         Problem list   Diabetes  Never smoker  DLD      Subjective    HPI   Diabetes Management  Patient reports generally stable blood sugar levels, with only one instance of glucose over 200 mg/dL in recent months He has missed checking his blood sugar for the past two days but brought his glucose meter for data download - but we were unable to download due to an issue with his meter. He also brings in a handwritten log with average BG ~130-140s with intermittent spikes to 200 as discussed above.. The patient reports having metformin and two unopened diabetes medications at home (?Farxiga). The patient expresses interest in starting medication if his A1c is over 6.5, noting his last A1c was 6.7.    Medication Adherence  The patient acknowledges not taking his cholesterol medication daily, occasionally missing doses.  Does not have a pillbox, but would like one.     Weight Management  Since his last visit in July, the patient has experienced significant weight gain of approximately 30 pounds. He attributes this to late-night eating, often consuming food even when not hungry, and sometimes eating out of boredom rather than increased stress. He reports not working out.     Eye Examination  The patient has not had a recent eye examination, having missed a scheduled appointment due to confusion. He is considering getting an eye exam at [facility] due to long wait times for hospital appointments.    General Health  The patient reports no new stressors or significant changes in his overall health status beyond the weight gain and medication adherence issues.    Medical History  - Diabetes mellitus type 2  - Hyperlipidemia       Exam    Physical Exam     Visit Vitals  /67   Pulse 80   Ht 1.626 m (5' 4\")   Wt 94.3 kg (208 lb)   BMI 35.70 kg/m²   Smoking Status Never   BSA 2.06 m²    "     Physical Exam  Vitals reviewed.   Constitutional:       Appearance: Normal appearance. He is normal weight.   HENT:      Right Ear: Tympanic membrane normal.      Left Ear: Tympanic membrane normal.   Cardiovascular:      Rate and Rhythm: Normal rate.   Pulmonary:      Effort: Pulmonary effort is normal.      Breath sounds: Normal breath sounds.   Neurological:      Mental Status: He is alert.          Objective    Medications     Current Outpatient Medications   Medication Instructions    atorvastatin (LIPITOR) 10 mg, oral, Daily    blood sugar diagnostic (True Metrix Glucose Test Strip) strip 1 strip, miscellaneous, Daily    Easy Touch Alcohol Prep Pads pads, medicated     lancets misc For use to check    True Metrix Glucose Test Strip strip          Assessment/Plan    Assessment/Plan    Diabetes:  - Patient monitoring blood sugars at home. Repeat A1c today     Dyslipidemia:  - Patient not taking cholesterol medication as prescribed - advised (again) to start taking it; last refill for 30 days in April.   - Plan: Recheck lipid panel and provided a pillbox.      Eye Health:  - Last eye exam was in 2015.  - Plan: Patient to go to an outside optometrist.      RTC 4 months    Problem List Items Addressed This Visit       Type 2 diabetes mellitus with hyperglycemia, without long-term current use of insulin - Primary    Relevant Orders    POCT glycosylated hemoglobin (Hb A1C) manually resulted    Hemoglobin A1c     Other Visit Diagnoses         Nonadherence to medication          Abnormal weight gain                     Jeremy Montemayor MD MPH  I am the attending physician.

## 2025-06-12 NOTE — PATIENT INSTRUCTIONS
As discussed today, our plan is:     Labs - you can get this done today or come back anytime in the next 4-6 weeks at any  facility. Our office will contact you if any of your results are abnormal. You can view all of your results on [employer].    Please get your eye exam done    Please come back to see me in: 4 months  ------  If you have any problems or questions, please contact the clinic at [phone] to leave a message. Our fax number is [phone]. If your issue cannot wait until the next business day, please go to urgent care or the emergency department.     No shows: It is understandable if you are unable to make it to a visit, but please cancel your appointment instead of not showing up. This helps to give other patients access to primary care and keeps wait times low.      Dr. Jeremy Montemayor

## 2025-06-13 LAB
EST. AVERAGE GLUCOSE BLD GHB EST-MCNC: 154 MG/DL
EST. AVERAGE GLUCOSE BLD GHB EST-SCNC: 8.5 MMOL/L
HBA1C MFR BLD: 7 %

## 2025-06-19 DIAGNOSIS — E11.65 TYPE 2 DIABETES MELLITUS WITH HYPERGLYCEMIA, WITHOUT LONG-TERM CURRENT USE OF INSULIN: Primary | ICD-10-CM

## 2025-07-01 ENCOUNTER — APPOINTMENT (OUTPATIENT)
Dept: PHARMACY | Facility: HOSPITAL | Age: 60
End: 2025-07-01
Payer: COMMERCIAL

## 2025-07-01 DIAGNOSIS — E11.65 TYPE 2 DIABETES MELLITUS WITH HYPERGLYCEMIA, WITHOUT LONG-TERM CURRENT USE OF INSULIN: ICD-10-CM

## 2025-07-01 RX ORDER — METFORMIN HYDROCHLORIDE 500 MG/1
500 TABLET ORAL
COMMUNITY

## 2025-07-01 RX ORDER — MULTIVIT WITH IRON,MINERALS
1 TABLET ORAL DAILY
COMMUNITY

## 2025-07-01 NOTE — ASSESSMENT & PLAN NOTE
Assessment:    Patient's DMT2 is essentially controlled based on most recent hemoglobin A1C level of 7% from 6/12/25 and the average of patient's home glucose readings for the past 2 weeks. Patient currently on metformin 500mg PO daily with breakfast and reports compliance with med and denies any ADRs to med at this dose.      Plan:    - Continue metformin 500mg PO daily with breakfast   - Continue checking glucose level at home every day in the morning before eating or drinking anything and have readings available at next visit with clinical pharmacist  - Instructed patient to go to lab prior to next visit with clinical pharmacist to have CMP, vitamin B12 level, and albumin/SCr ratio checked  - Referral for ophthalmology placed

## 2025-07-01 NOTE — PROGRESS NOTES
Primary Care Clinical Pharmacist Initial Visit      Date of Initial Visit: 7/1/25  Disease state(s) to be managed by clinical pharmacist: DMT2  Referring Provider: Dr. Jeremy Montemayor (PCP)  Most recent appointment with PCP: 6/12/25  Next appointment with PCP: 10/14/25            Subjective:    Patient is a 61 YO M who presents virtually to visit with clinical pharmacist for initial visit for management of DMT2. Patient gives consent to have visit conducted via telehealth. Patient was referred to clinical pharmacist for management of disease state(s) by PCP. At today's visit, patient reports metformin 500mg PO daily with breakfast and reports compliance with med and denies any ADRs to med at this dose.    Diet: 3 meals per day  1st meal of the day: salad, peanut butter, bread, rice, chicken  2nd meal of the day: rice, peas, chicken  3rd meal of the day: rice, peas, chicken  Snacks: multigrain crackers, grapes, pineapple, blueberries, bananas  Drinks: sugar-free flavored water    Exercise: leads a non-sedentary lifestyle       Objective:    Most recent hemoglobin A1C level: 7% (6/12/25) (goal: less than 7%)      Below are patient's home glucose readings for the past 2 weeks (all readings morning fasting):    127, 119, 138, 146, 133, 131, 145, 115, 143, 112, 139, 135, 131, 120, 145, 118    (Average: 131 mg/dL, goal:  mg/dL)    Most recent eGFR: 84 (2/6/25)  Most recent vitamin B12 level: none on file for the past 12 months  Most recent Albumin/SCr ratio:  6.2 (7/15/24)  Most recent AST/ALT:    20/20 (2/6/25)  Most recent diabetic retinopathy exam: never-patient would like to be referred to ophthalmology    Most recent pneumococcal vaccine: 3/6/23 (PCV20)    Below are the results of patient's most recent lipid panel (from 2/6/25) :    LDL:   54  (goal: less than 100)  TG :   66  (goal: less than  500)         HDL:   69   (goal: greater than 40)    TC: 138 (goal: less than 200)      Assessment:    Patient's DMT2 is essentially controlled based on most recent hemoglobin A1C level of 7% from 6/12/25 and the average of patient's home glucose readings for the past 2 weeks. Patient currently on metformin 500mg PO daily with breakfast and reports compliance with med and denies any ADRs to med at this dose.      Plan:    - Continue metformin 500mg PO daily with breakfast   - Continue checking glucose level at home every day in the morning before eating or drinking anything and have readings available at next visit with clinical pharmacist  - Instructed patient to go to lab prior to next visit with clinical pharmacist to have CMP, vitamin B12 level, and albumin/SCr ratio checked  - Referral for ophthalmology placed       The patient verbalized understanding of everything discussed at today's visit and agrees with plan formulated by clinical pharmacist    Next visit with clinical pharmacist:  7/29/25 at 10 AM via telehealth      Continue all meds under the continuation of care with the referring provider and clinical pharmacy team.        MARIA D Boyce, PharmD, BCACP  Clinical Pharmacy Specialist, [facility]

## 2025-07-07 ENCOUNTER — TELEPHONE (OUTPATIENT)
Dept: PRIMARY CARE | Facility: CLINIC | Age: 60
End: 2025-07-07
Payer: COMMERCIAL

## 2025-07-29 ENCOUNTER — APPOINTMENT (OUTPATIENT)
Dept: PHARMACY | Facility: HOSPITAL | Age: 60
End: 2025-07-29
Payer: COMMERCIAL

## 2025-07-29 DIAGNOSIS — E11.65 TYPE 2 DIABETES MELLITUS WITH HYPERGLYCEMIA, WITHOUT LONG-TERM CURRENT USE OF INSULIN: Primary | ICD-10-CM

## 2025-07-29 LAB
ALBUMIN SERPL-MCNC: 4.6 G/DL (ref 3.6–5.1)
ALBUMIN/CREAT UR: 22 MG/G CREAT
ALP SERPL-CCNC: 61 U/L (ref 35–144)
ALT SERPL-CCNC: 14 U/L (ref 9–46)
ANION GAP SERPL CALCULATED.4IONS-SCNC: 10 MMOL/L (CALC) (ref 7–17)
AST SERPL-CCNC: 13 U/L (ref 10–35)
BILIRUB SERPL-MCNC: 0.4 MG/DL (ref 0.2–1.2)
BUN SERPL-MCNC: 18 MG/DL (ref 7–25)
CALCIUM SERPL-MCNC: 9.6 MG/DL (ref 8.6–10.3)
CHLORIDE SERPL-SCNC: 107 MMOL/L (ref 98–110)
CO2 SERPL-SCNC: 26 MMOL/L (ref 20–32)
CREAT SERPL-MCNC: 1.23 MG/DL (ref 0.7–1.3)
CREAT UR-MCNC: 214 MG/DL (ref 20–320)
EGFRCR SERPLBLD CKD-EPI 2021: 68 ML/MIN/1.73M2
GLUCOSE SERPL-MCNC: 129 MG/DL (ref 65–99)
MICROALBUMIN UR-MCNC: 4.7 MG/DL
POTASSIUM SERPL-SCNC: 4.5 MMOL/L (ref 3.5–5.3)
PROT SERPL-MCNC: 7.6 G/DL (ref 6.1–8.1)
SODIUM SERPL-SCNC: 143 MMOL/L (ref 135–146)
VIT B12 SERPL-MCNC: 298 PG/ML (ref 200–1100)

## 2025-07-29 NOTE — PROGRESS NOTES
Primary Care Clinical Pharmacist Follow-Up Visit    Date of Follow-Up Visit: 7/29/25  Date of Initial Visit: 7/1/25  Disease state(s) to be managed by clinical pharmacist: DMT2  Referring Provider: Dr. Jeremy Montemayor (PCP)  Most recent appointment with PCP: 6/12/25  Next appointment with PCP: 10/14/25            Subjective:    Patient is a 61 YO M who presents virtually to visit with clinical pharmacist for follow-up visit for management of DMT2. Patient gives consent to have visit conducted via telehealth. Patient was referred to clinical pharmacist for management of disease state(s) by PCP. At last visit, patient was continued on metformin 500mg PO daily with breakfast. At today's visit, patient reports taking metformin 500mg PO daily with breakfast and reports compliance with med and denies any ADRs to med at this dose.    Diet: 3 meals per day  1st meal of the day: salad, peanut butter, bread, rice, chicken  2nd meal of the day: rice, peas, chicken  3rd meal of the day: rice, peas, chicken  Snacks: multigrain crackers, grapes, pineapple, blueberries, bananas  Drinks: sugar-free flavored water    Exercise: leads a non-sedentary lifestyle       Objective:    Most recent hemoglobin A1C level: 7% (6/12/25) (goal: less than 7%)      Below are patient's home glucose readings for the past 2 weeks (all readings morning fasting):    139, 144, 106, 119, 145, 154, 105, 126, 148, 118, 106, 111, 137, 139     (Average: 128 mg/dL, goal:  mg/dL)    Most recent eGFR: 68 (7/28/25)  Most recent vitamin B12 level: 298 (7/28/25)  Most recent Albumin/SCr ratio:  6.2 (7/15/24)  Most recent AST/ALT:  13/14 (7/28/25)  Most recent diabetic retinopathy exam: never-patient has already been referred to ophthalmology    Most recent pneumococcal vaccine: 3/6/23 (PCV20)    Below are the results of patient's most recent  lipid panel (from 2/6/25) :    LDL:   54  (goal: less than 100)  TG :   66  (goal: less than 500)         HDL:   69   (goal: greater than 40)    TC: 138 (goal: less than 200)      Assessment:    Patient's DMT2 is essentially controlled based on most recent hemoglobin A1C level of 7% from 6/12/25 and the average of patient's home glucose readings for the past 2 weeks. Patient currently on metformin 500mg PO daily with breakfast and reports compliance with med and denies any ADRs to med at this dose.      Plan:    - Continue metformin 500mg PO daily with breakfast   - Continue checking glucose level at home every day in the morning before eating or drinking anything and have readings available at next visit with clinical pharmacist          The patient verbalized understanding of everything discussed at today's visit and agrees with plan formulated by clinical pharmacist    Next visit with clinical pharmacist:  8/27/25 at 9:40 AM via telehealth      Continue all meds under the continuation of care with the referring provider and clinical pharmacy team.        MARIA D Boyce, PharmD, BCACP  Clinical Pharmacy Specialist, [facility]

## 2025-07-29 NOTE — ASSESSMENT & PLAN NOTE
Assessment:    Patient's DMT2 is essentially controlled based on most recent hemoglobin A1C level of 7% from 6/12/25 and the average of patient's home glucose readings for the past 2 weeks. Patient currently on metformin 500mg PO daily with breakfast and reports compliance with med and denies any ADRs to med at this dose.      Plan:    - Continue metformin 500mg PO daily with breakfast   - Continue checking glucose level at home every day in the morning before eating or drinking anything and have readings available at next visit with clinical pharmacist

## 2025-08-27 ENCOUNTER — APPOINTMENT (OUTPATIENT)
Dept: PHARMACY | Facility: HOSPITAL | Age: 60
End: 2025-08-27
Payer: COMMERCIAL

## 2025-08-27 DIAGNOSIS — E11.65 TYPE 2 DIABETES MELLITUS WITH HYPERGLYCEMIA, WITHOUT LONG-TERM CURRENT USE OF INSULIN: ICD-10-CM

## 2025-09-17 ENCOUNTER — APPOINTMENT (OUTPATIENT)
Dept: PHARMACY | Facility: HOSPITAL | Age: 60
End: 2025-09-17
Payer: COMMERCIAL

## 2025-10-14 ENCOUNTER — APPOINTMENT (OUTPATIENT)
Dept: PRIMARY CARE | Facility: CLINIC | Age: 60
End: 2025-10-14
Payer: COMMERCIAL